# Patient Record
Sex: MALE | Race: WHITE | NOT HISPANIC OR LATINO | URBAN - METROPOLITAN AREA
[De-identification: names, ages, dates, MRNs, and addresses within clinical notes are randomized per-mention and may not be internally consistent; named-entity substitution may affect disease eponyms.]

---

## 2017-08-01 ENCOUNTER — EMERGENCY (EMERGENCY)
Facility: HOSPITAL | Age: 57
LOS: 0 days | Discharge: HOME | End: 2017-08-02
Attending: EMERGENCY MEDICINE | Admitting: EMERGENCY MEDICINE
Payer: COMMERCIAL

## 2017-08-01 VITALS
HEART RATE: 72 BPM | SYSTOLIC BLOOD PRESSURE: 126 MMHG | RESPIRATION RATE: 14 BRPM | DIASTOLIC BLOOD PRESSURE: 85 MMHG | OXYGEN SATURATION: 98 % | TEMPERATURE: 98 F

## 2017-08-01 VITALS — HEIGHT: 67 IN | WEIGHT: 160.06 LBS
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
## 2017-08-01 DIAGNOSIS — S61.216A LACERATION WITHOUT FOREIGN BODY OF RIGHT LITTLE FINGER WITHOUT DAMAGE TO NAIL, INITIAL ENCOUNTER: ICD-10-CM
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
## 2017-08-01 PROCEDURE — 99284 EMERGENCY DEPT VISIT MOD MDM: CPT | Mod: 25

## 2017-08-01 PROCEDURE — 73140 X-RAY EXAM OF FINGER(S): CPT | Mod: 26,RT

## 2017-08-01 PROCEDURE — 12001 RPR S/N/AX/GEN/TRNK 2.5CM/<: CPT

## 2017-08-01 NOTE — ED ADULT NURSE NOTE - CHPI ED SYMPTOMS NEG
no tingling/no chills/no vomiting/no dizziness/no nausea/no fever/no weakness/no decreased eating/drinking/no numbness

## 2017-08-01 NOTE — ED PROVIDER NOTE - SKIN, MLM
Skin normal color for race, warm, dry and intact. No evidence of rash. Skin normal color for race, warm, dry and intact. No evidence of rash.  2cm v-shaped laceration to tip of finger on pad surface, mildly gaping, slight venous bleeding.

## 2017-08-01 NOTE — ED PROVIDER NOTE - OBJECTIVE STATEMENT
56 y/o male presents to the ED c/o right 4th and 5th finger pain s/p crushing it in a car door today. +bleeding to 5th finger, which pt wrapped. Last tetanus unknown. 58 y/o male presents to the ED c/o right 4th and 5th finger pain s/p crushing it today.  Pt was folding the soft-top of his Jeep and finger was crushed between two metal poles.   +bleeding to 5th finger, which pt wrapped. Last tetanus unknown.

## 2017-08-01 NOTE — ED PROVIDER NOTE - MUSCULOSKELETAL, MLM
Spine appears normal, range of motion is not limited, no muscle or joint tenderness FROM to right 5th finger without pain

## 2017-08-01 NOTE — ED ADULT NURSE NOTE - OBJECTIVE STATEMENT
Pt presented to ED for finger injury. As per pt, pt's left 5th finger got caught between car frame and roof, and sustained the lac around 1900 hrs. Noticed 4cm lac to left 5th finger with active bleeding. Last tetanus unknown. Pt presented to ED for finger injury. As per pt, pt's right 5th finger got caught between car frame and roof, and sustained the lac around 1900 hrs. Noticed 4cm lac to right 5th finger with active bleeding. Last tetanus unknown.

## 2017-08-02 RX ORDER — TETANUS TOXOID, REDUCED DIPHTHERIA TOXOID AND ACELLULAR PERTUSSIS VACCINE, ADSORBED 5; 2.5; 8; 8; 2.5 [IU]/.5ML; [IU]/.5ML; UG/.5ML; UG/.5ML; UG/.5ML
0.5 SUSPENSION INTRAMUSCULAR ONCE
Qty: 0 | Refills: 0 | Status: COMPLETED | OUTPATIENT
Start: 2017-08-02 | End: 2017-08-02

## 2017-08-02 RX ORDER — IBUPROFEN 200 MG
600 TABLET ORAL ONCE
Qty: 0 | Refills: 0 | Status: COMPLETED | OUTPATIENT
Start: 2017-08-02 | End: 2017-08-02

## 2017-08-02 RX ADMIN — Medication 600 MILLIGRAM(S): at 00:48

## 2017-08-02 RX ADMIN — TETANUS TOXOID, REDUCED DIPHTHERIA TOXOID AND ACELLULAR PERTUSSIS VACCINE, ADSORBED 0.5 MILLILITER(S): 5; 2.5; 8; 8; 2.5 SUSPENSION INTRAMUSCULAR at 00:47

## 2017-08-02 NOTE — ED PROCEDURE NOTE - PROCEDURE ADDITIONAL DETAILS
pt warned of the danger of necrosis to the skin flap over the v-shaped laceration as the end of the flap is very thin, pulled together with as little tension as possible and f/u to hand given

## 2020-07-28 NOTE — ED PROVIDER NOTE - CROS ED MUSC ALL NEG
Pt complains of upper abdominal pain and nausea x 1 day.  Pt denies fever, cough, sick contacts. - - -
